# Patient Record
Sex: MALE
[De-identification: names, ages, dates, MRNs, and addresses within clinical notes are randomized per-mention and may not be internally consistent; named-entity substitution may affect disease eponyms.]

---

## 2022-11-04 ENCOUNTER — APPOINTMENT (OUTPATIENT)
Dept: ORTHOPEDIC SURGERY | Facility: CLINIC | Age: 32
End: 2022-11-04
Payer: COMMERCIAL

## 2022-11-04 VITALS — WEIGHT: 183 LBS | HEIGHT: 69 IN | BODY MASS INDEX: 27.11 KG/M2

## 2022-11-04 DIAGNOSIS — Z78.9 OTHER SPECIFIED HEALTH STATUS: ICD-10-CM

## 2022-11-04 PROBLEM — Z00.00 ENCOUNTER FOR PREVENTIVE HEALTH EXAMINATION: Status: ACTIVE | Noted: 2022-11-04

## 2022-11-04 PROCEDURE — 73030 X-RAY EXAM OF SHOULDER: CPT | Mod: RT

## 2022-11-04 PROCEDURE — 73010 X-RAY EXAM OF SHOULDER BLADE: CPT | Mod: RT

## 2022-11-04 PROCEDURE — 99203 OFFICE O/P NEW LOW 30 MIN: CPT

## 2022-11-04 RX ORDER — METHYLPREDNISOLONE 4 MG/1
4 TABLET ORAL
Qty: 1 | Refills: 0 | Status: ACTIVE | COMMUNITY
Start: 2022-11-04 | End: 1900-01-01

## 2022-11-04 NOTE — ASSESSMENT
[FreeTextEntry1] : NEW ONSET MEDIAL BORDER OF SCAP PAIN AND RADICULAR SYMPTOMS AFTER LIFTING SON ON HALLOWEEN\par COURSE OF PT AND MEDS ADVISED\par SHOULDER XR NORMAL TODAY\par DISCUSSED FORMAL NECK WORK UP NEXT TIME IF PERSISTS

## 2022-11-04 NOTE — HISTORY OF PRESENT ILLNESS
[7] : 7 [2] : 2 [Localized] : localized [Stabbing] : stabbing [] : no [FreeTextEntry1] : right shoulder [FreeTextEntry3] : 1 week  [FreeTextEntry5] : Pt states he started feeling pain in his right shoulder and some in his upper back.

## 2022-11-04 NOTE — IMAGING
[de-identified] : No swelling, no ecchymosis\par Medial border of scap and paracervical tenderness to palpation\par Diminished range of motion in all planes; pain radiates down arm with neck rotation\par 5/5 deltoid, biceps, triceps and wrist flexors/extensors\par Positive spurling, negative bernardo\par Reflexes +2, intact motor distally\par Altered sensation distally\par  [Right] : right shoulder [There are no fractures, subluxations or dislocations. No significant abnormalities are seen] : There are no fractures, subluxations or dislocations. No significant abnormalities are seen

## 2022-11-16 ENCOUNTER — APPOINTMENT (OUTPATIENT)
Dept: ORTHOPEDIC SURGERY | Facility: CLINIC | Age: 32
End: 2022-11-16

## 2022-11-16 VITALS — BODY MASS INDEX: 27.11 KG/M2 | HEIGHT: 69 IN | WEIGHT: 183 LBS

## 2022-11-16 DIAGNOSIS — M54.12 RADICULOPATHY, CERVICAL REGION: ICD-10-CM

## 2022-11-16 PROCEDURE — 99213 OFFICE O/P EST LOW 20 MIN: CPT

## 2022-11-16 NOTE — HISTORY OF PRESENT ILLNESS
[Dull/Aching] : dull/aching [7] : 7 [2] : 2 [Localized] : localized [Stabbing] : stabbing [de-identified] : 11/16/22: FOLLOW UP RIGHT SHOULDER/SCAP. 100% IMPROVED, NO PAIN TODAY. TOOK MDP AND DOING HEP WHICH HELPED. [] : no [FreeTextEntry1] : right shoulder [FreeTextEntry3] : 1 week  [FreeTextEntry5] : Pt states he started feeling pain in his right shoulder and some in his upper back.  [de-identified] : ice

## 2022-11-16 NOTE — ASSESSMENT
[FreeTextEntry1] : SYMPTOMS HAVE COMPLETELY RESOLVED FROM 2 WEEKS AGO\par CONT CURRENT MGMT\par RTO IF SYMPTOMS RETURN

## 2022-11-16 NOTE — IMAGING
[de-identified] : No swelling, no ecchymosis\par NO TTP\par FULL ROM NECK AND RIGHT SHOULDER\par 5/5 deltoid, biceps, triceps and wrist flexors/extensors\par Reflexes +2, intact motor distally\par